# Patient Record
Sex: MALE | Race: WHITE | NOT HISPANIC OR LATINO | Employment: FULL TIME | URBAN - METROPOLITAN AREA
[De-identification: names, ages, dates, MRNs, and addresses within clinical notes are randomized per-mention and may not be internally consistent; named-entity substitution may affect disease eponyms.]

---

## 2018-05-25 ENCOUNTER — OFFICE VISIT (OUTPATIENT)
Dept: URGENT CARE | Facility: CLINIC | Age: 68
End: 2018-05-25
Payer: COMMERCIAL

## 2018-05-25 VITALS
WEIGHT: 205 LBS | TEMPERATURE: 98 F | OXYGEN SATURATION: 98 % | HEIGHT: 74 IN | SYSTOLIC BLOOD PRESSURE: 148 MMHG | DIASTOLIC BLOOD PRESSURE: 73 MMHG | BODY MASS INDEX: 26.31 KG/M2 | HEART RATE: 64 BPM | RESPIRATION RATE: 16 BRPM

## 2018-05-25 DIAGNOSIS — M54.32 SCIATICA OF LEFT SIDE: Primary | ICD-10-CM

## 2018-05-25 PROCEDURE — 99204 OFFICE O/P NEW MOD 45 MIN: CPT | Mod: S$GLB,,,

## 2018-05-25 RX ORDER — CYCLOBENZAPRINE HCL 5 MG
5 TABLET ORAL 2 TIMES DAILY
Qty: 20 TABLET | Refills: 0 | Status: SHIPPED | OUTPATIENT
Start: 2018-05-25 | End: 2018-06-04

## 2018-05-25 RX ORDER — TRAMADOL HYDROCHLORIDE 50 MG/1
TABLET ORAL
Qty: 10 TABLET | Refills: 0 | Status: SHIPPED | OUTPATIENT
Start: 2018-05-25

## 2018-05-25 NOTE — PATIENT INSTRUCTIONS

## 2018-05-25 NOTE — PROGRESS NOTES
"Subjective:       Patient ID: Dakota Guzman is a 67 y.o. male.    Vitals:  height is 6' 2" (1.88 m) and weight is 93 kg (205 lb). His oral temperature is 98 °F (36.7 °C). His blood pressure is 148/73 (abnormal) and his pulse is 64. His respiration is 16 and oxygen saturation is 98%.     Chief Complaint: Back Pain ( lower left Side )    Pt is 68 yo male with left sided sciatica for 36 hours;      Back Pain   This is a new problem. The current episode started yesterday. The problem occurs constantly (pt c/o left sided back pain. Denies any trauma). The problem has been gradually worsening since onset. The pain is present in the lumbar spine. The pain does not radiate. The pain is at a severity of 6/10. The pain is mild. Pertinent negatives include no abdominal pain, chest pain, fever or headaches. He has tried NSAIDs for the symptoms. The treatment provided no relief.     Review of Systems   Constitution: Negative for chills and fever.   HENT: Negative for sore throat.    Eyes: Negative for blurred vision.   Cardiovascular: Negative for chest pain.   Respiratory: Negative for shortness of breath.    Skin: Negative for rash.   Musculoskeletal: Positive for back pain. Negative for joint pain.        Left hip sciatica;   Gastrointestinal: Negative for abdominal pain, diarrhea, nausea and vomiting.   Neurological: Negative for headaches.   Psychiatric/Behavioral: The patient is not nervous/anxious.        Objective:      Physical Exam   Constitutional: He appears well-developed and well-nourished. No distress.   HENT:   Head: Normocephalic and atraumatic.   Right Ear: External ear normal.   Left Ear: External ear normal.   Nose: Nose normal.   Mouth/Throat: Oropharynx is clear and moist.   Eyes: Conjunctivae and EOM are normal. Pupils are equal, round, and reactive to light.   Neck: Normal range of motion.   Cardiovascular: Normal rate, regular rhythm, normal heart sounds and intact distal pulses.    Pulmonary/Chest: " Effort normal and breath sounds normal.   Musculoskeletal: He exhibits tenderness. He exhibits no edema or deformity.   Left hip sciatica; tender to ROM;   Neurological: He is alert.   Skin: Skin is warm and dry. Capillary refill takes 2 to 3 seconds. He is not diaphoretic.   Psychiatric: He has a normal mood and affect. His behavior is normal. Judgment and thought content normal.       Assessment:       1. Sciatica of left side        Plan:     Exercises as described; muscle relaxant and tramadol at bedtime;     Sciatica of left side  -     cyclobenzaprine (FLEXERIL) 5 MG tablet; Take 1 tablet (5 mg total) by mouth 2 (two) times daily.  Dispense: 20 tablet; Refill: 0  -     traMADol (ULTRAM) 50 mg tablet; At bedtime after meal  Dispense: 10 tablet; Refill: 0

## 2018-05-28 ENCOUNTER — TELEPHONE (OUTPATIENT)
Dept: URGENT CARE | Facility: CLINIC | Age: 68
End: 2018-05-28

## 2018-08-19 ENCOUNTER — OFFICE VISIT (OUTPATIENT)
Dept: URGENT CARE | Facility: CLINIC | Age: 68
End: 2018-08-19
Payer: COMMERCIAL

## 2018-08-19 VITALS
RESPIRATION RATE: 16 BRPM | TEMPERATURE: 98 F | OXYGEN SATURATION: 97 % | BODY MASS INDEX: 27.77 KG/M2 | HEART RATE: 86 BPM | WEIGHT: 205 LBS | DIASTOLIC BLOOD PRESSURE: 71 MMHG | SYSTOLIC BLOOD PRESSURE: 124 MMHG | HEIGHT: 72 IN

## 2018-08-19 DIAGNOSIS — M46.1 SACROILIITIS: Primary | ICD-10-CM

## 2018-08-19 PROCEDURE — 99214 OFFICE O/P EST MOD 30 MIN: CPT | Mod: 25,S$GLB,, | Performed by: PHYSICIAN ASSISTANT

## 2018-08-19 PROCEDURE — 96372 THER/PROPH/DIAG INJ SC/IM: CPT | Mod: S$GLB,,, | Performed by: PHYSICIAN ASSISTANT

## 2018-08-19 RX ORDER — PREDNISONE 20 MG/1
TABLET ORAL
Qty: 12 TABLET | Refills: 0 | Status: SHIPPED | OUTPATIENT
Start: 2018-08-19

## 2018-08-19 RX ORDER — KETOROLAC TROMETHAMINE 30 MG/ML
30 INJECTION, SOLUTION INTRAMUSCULAR; INTRAVENOUS
Status: COMPLETED | OUTPATIENT
Start: 2018-08-19 | End: 2018-08-19

## 2018-08-19 RX ORDER — MELOXICAM 15 MG/1
15 TABLET ORAL DAILY
Qty: 14 TABLET | Refills: 0 | Status: SHIPPED | OUTPATIENT
Start: 2018-08-19

## 2018-08-19 RX ADMIN — KETOROLAC TROMETHAMINE 30 MG: 30 INJECTION, SOLUTION INTRAMUSCULAR; INTRAVENOUS at 10:08

## 2018-08-19 NOTE — PATIENT INSTRUCTIONS
1.  Take all medications as directed. If you have been prescribed antibiotics, make sure to complete them.   2.  Rest and keep yourself/patient well hydrated. For adults, it is recommended to drink at least 8-10 glasses of water daily.   3.  For patients above 6 months of age who are not allergic to and are not on anticoagulants, you can alternate Tylenol and Motrin every 4-6 hours for fever above 100.4F and/or pain.  For patients less than 6 months of age, allergic to or intolerant to NSAIDS, have gastritis, gastric ulcers, or history of GI bleeds, are pregnant, or are on anticoagulant therapy, you can take Tylenol every 4 hours as needed for fever above 100.4F and/or pain.   4. You should schedule a follow-up appointment with your Primary Care Provider/Pediatrician for recheck in 2-3 days or as directed at this visit.   5.  If your condition fails to improve in a timely manner, you should receive another evaluation by your Primary Care Provider/Pediatrician to discuss your concerns or return to urgent care for a recheck.  If your condition worsens at any time, you should report immediately to your nearest Emergency Department for further evaluation. **You must understand that you have received Urgent Care treatment only and that you may be released before all of your medical problems are known or treated. You, the patient, are responsible to arrange for follow-up care as instructed.         Sacroiliitis  The sacrum is the triangle-shaped bone at the base of the spine. It is linked to the other pelvis bones by the sacroiliac joints, also called SI joints. Sacroiliitis is when one or both SI joints are hurt or inflamed. It can make small movements of the lower back and pelvis very painful.        This condition has been linked to other diseases. They include ankylosing spondylitis, rheumatoid arthritis, psoriasis, and Crohns disease or colitis. Symptoms may include pain or stiffness in the hips, lower back, thighs,  or buttocks. Pain occurs most often in the morning or after sitting for long periods of time. The pain may get worse when you walk. The swinging motion of the hips strains the SI joints.  Sacroiliitis is caused by many factors such as:  · Heavy lifting, especially if not done the right way  · Severe injury, such as a fall or car accident  · Osteoarthritis  · Pregnancy  · Infection of the joint  This condition is hard to diagnose. It may be confused with other causes of low back pain. To confirm the diagnosis, you may be given a shot of numbing medicine in the SI joint. Treatment includes rest, physical therapy, and anti-inflammatory medicines. If another health problem is the cause, then that must also be treated. More testing may be needed if your symptoms dont get better.  Home care  · If your healthcare provider has prescribed medicines, take all of them as directed.  · You may use over-the-counter pain medicine to control pain, unless another medicine was prescribed. If prednisone was prescribed, dont use NSAIDs, or nonsteroidal anti-inflammatory drugs, such as ibuprofen or naproxen. Talk with your provider before using this medicine if you have chronic liver or kidney disease or ever had a stomach ulcer or GI bleeding.  · If you were referred to physical therapy, make an appointment. Be sure to do any prescribed exercises.  · Dont smoke. Smoking reduces blood flow to the inflamed area. This makes it harder to treat.  Follow-up care  Follow up with your healthcare provider, or as advised.  If you had an X-ray or an MRI, you will be notified of any new findings that may affect your care.  When to seek medical advice  Contact your healthcare provider right away if any of these occur:  · Increasing low back pain  · Inflammation of the eyes  · Skin rash or redness  · Weakness or numbness in one or both legs  · Loss of bowel or bladder control  · Numbness in the groin area  Date Last Reviewed: 11/24/2015  ©  7124-8200 [employer]. [address] [place] [address], [place] [address]. All rights reserved. This information is not intended as a substitute for professional medical care. Always follow your healthcare professional's instructions.

## 2018-08-19 NOTE — PROGRESS NOTES
Subjective:       Patient ID: Dakota Guzman is a 67 y.o. male.    Vitals:  height is 6' (1.829 m) and weight is 93 kg (205 lb). His oral temperature is 97.7 °F (36.5 °C). His blood pressure is 124/71 and his pulse is 86. His respiration is 16 and oxygen saturation is 97%.     Chief Complaint: Back Pain (lower )    Patient complains of back pain that has been present for the past 3 days.  Patient states that the pain is located on the right lower portion of his back.  He denies any injury or trauma.  He reports a history of arthritis to his L-spine.  Patient denies any radiation of pain, saddle anesthesia, bowel or bladder incontinence, or numbness/weakness in their extremities.  Patient also denies any urinary symptoms including burning with urination, urgency, frequency, hematuria, or discharge.  Patient denies fever and chills.  Patient denies any other complaints at this time.        Back Pain   This is a new problem. The current episode started in the past 7 days. The problem has been gradually worsening since onset. The pain is present in the lumbar spine and sacro-iliac. The quality of the pain is described as aching. The pain is at a severity of 8/10. The pain is moderate. The symptoms are aggravated by bending. Pertinent negatives include no abdominal pain, bladder incontinence, bowel incontinence, chest pain, dysuria, fever or numbness. Risk factors: pt c/o lower right sided back pain. denies any trauma. He has tried NSAIDs and heat for the symptoms. The treatment provided no relief.     Review of Systems   Constitution: Negative for chills, fever and malaise/fatigue.   Cardiovascular: Negative for chest pain.   Respiratory: Negative for shortness of breath.    Skin: Negative for rash.   Musculoskeletal: Positive for back pain and joint pain. Negative for muscle cramps, muscle weakness and stiffness.   Gastrointestinal: Negative for abdominal pain, bowel incontinence, diarrhea, nausea and vomiting.    Genitourinary: Negative for bladder incontinence, dysuria, hematuria and urgency.   Neurological: Negative for disturbances in coordination and numbness.   All other systems reviewed and are negative.      Objective:      Physical Exam   Constitutional: He is oriented to person, place, and time. Vital signs are normal. He appears well-developed and well-nourished. He is active and cooperative. No distress.   HENT:   Head: Normocephalic and atraumatic.   Nose: Nose normal.   Mouth/Throat: Oropharynx is clear and moist and mucous membranes are normal.   Eyes: Conjunctivae and lids are normal.   Neck: Trachea normal, normal range of motion, full passive range of motion without pain and phonation normal. Neck supple.   Cardiovascular: Normal rate, regular rhythm, normal heart sounds, intact distal pulses and normal pulses.   Pulmonary/Chest: Effort normal and breath sounds normal.   Abdominal: Soft. Normal appearance and bowel sounds are normal. He exhibits no abdominal bruit, no pulsatile midline mass and no mass.   Musculoskeletal: He exhibits no edema or deformity.        Lumbar back: He exhibits tenderness and pain. He exhibits normal range of motion, no bony tenderness (no midline/spinal tenderness present), no swelling, no edema, no deformity, no laceration, no spasm and normal pulse.        Back:    Neurological: He is alert and oriented to person, place, and time. He has normal strength and normal reflexes. No sensory deficit.   Skin: Skin is warm, dry and intact. He is not diaphoretic.   Psychiatric: He has a normal mood and affect. His speech is normal and behavior is normal. Judgment and thought content normal. Cognition and memory are normal.   Nursing note and vitals reviewed.      Assessment:       1. Sacroiliitis        Plan:         Sacroiliitis  -     ketorolac injection 30 mg; Inject 1 mL (30 mg total) into the muscle one time.  -     meloxicam (MOBIC) 15 MG tablet; Take 1 tablet (15 mg total) by  mouth once daily.  Dispense: 14 tablet; Refill: 0  -     predniSONE (DELTASONE) 20 MG tablet; Take 60 mg daily for 2 days, then 40 mg daily for 2 days, then 20 mg daily for 2 days.  Dispense: 12 tablet; Refill: 0          Patient Instructions   1.  Take all medications as directed. If you have been prescribed antibiotics, make sure to complete them.   2.  Rest and keep yourself/patient well hydrated. For adults, it is recommended to drink at least 8-10 glasses of water daily.   3.  For patients above 6 months of age who are not allergic to and are not on anticoagulants, you can alternate Tylenol and Motrin every 4-6 hours for fever above 100.4F and/or pain.  For patients less than 6 months of age, allergic to or intolerant to NSAIDS, have gastritis, gastric ulcers, or history of GI bleeds, are pregnant, or are on anticoagulant therapy, you can take Tylenol every 4 hours as needed for fever above 100.4F and/or pain.   4. You should schedule a follow-up appointment with your Primary Care Provider/Pediatrician for recheck in 2-3 days or as directed at this visit.   5.  If your condition fails to improve in a timely manner, you should receive another evaluation by your Primary Care Provider/Pediatrician to discuss your concerns or return to urgent care for a recheck.  If your condition worsens at any time, you should report immediately to your nearest Emergency Department for further evaluation. **You must understand that you have received Urgent Care treatment only and that you may be released before all of your medical problems are known or treated. You, the patient, are responsible to arrange for follow-up care as instructed.         Sacroiliitis  The sacrum is the triangle-shaped bone at the base of the spine. It is linked to the other pelvis bones by the sacroiliac joints, also called SI joints. Sacroiliitis is when one or both SI joints are hurt or inflamed. It can make small movements of the lower back and pelvis  very painful.        This condition has been linked to other diseases. They include ankylosing spondylitis, rheumatoid arthritis, psoriasis, and Crohns disease or colitis. Symptoms may include pain or stiffness in the hips, lower back, thighs, or buttocks. Pain occurs most often in the morning or after sitting for long periods of time. The pain may get worse when you walk. The swinging motion of the hips strains the SI joints.  Sacroiliitis is caused by many factors such as:  · Heavy lifting, especially if not done the right way  · Severe injury, such as a fall or car accident  · Osteoarthritis  · Pregnancy  · Infection of the joint  This condition is hard to diagnose. It may be confused with other causes of low back pain. To confirm the diagnosis, you may be given a shot of numbing medicine in the SI joint. Treatment includes rest, physical therapy, and anti-inflammatory medicines. If another health problem is the cause, then that must also be treated. More testing may be needed if your symptoms dont get better.  Home care  · If your healthcare provider has prescribed medicines, take all of them as directed.  · You may use over-the-counter pain medicine to control pain, unless another medicine was prescribed. If prednisone was prescribed, dont use NSAIDs, or nonsteroidal anti-inflammatory drugs, such as ibuprofen or naproxen. Talk with your provider before using this medicine if you have chronic liver or kidney disease or ever had a stomach ulcer or GI bleeding.  · If you were referred to physical therapy, make an appointment. Be sure to do any prescribed exercises.  · Dont smoke. Smoking reduces blood flow to the inflamed area. This makes it harder to treat.  Follow-up care  Follow up with your healthcare provider, or as advised.  If you had an X-ray or an MRI, you will be notified of any new findings that may affect your care.  When to seek medical advice  Contact your healthcare provider right away if any of  these occur:  · Increasing low back pain  · Inflammation of the eyes  · Skin rash or redness  · Weakness or numbness in one or both legs  · Loss of bowel or bladder control  · Numbness in the groin area  Date Last Reviewed: 11/24/2015  © 0732-1874 [employer]. [address] [place] [address]. All rights reserved. This information is not intended as a substitute for professional medical care. Always follow your healthcare professional's instructions.

## 2019-12-28 ENCOUNTER — OFFICE VISIT (OUTPATIENT)
Dept: URGENT CARE | Facility: CLINIC | Age: 69
End: 2019-12-28
Payer: COMMERCIAL

## 2019-12-28 VITALS
SYSTOLIC BLOOD PRESSURE: 157 MMHG | TEMPERATURE: 98 F | OXYGEN SATURATION: 99 % | BODY MASS INDEX: 27.77 KG/M2 | WEIGHT: 205 LBS | HEART RATE: 60 BPM | DIASTOLIC BLOOD PRESSURE: 74 MMHG | HEIGHT: 72 IN

## 2019-12-28 DIAGNOSIS — J45.909 ACUTE ASTHMATIC BRONCHITIS: Primary | ICD-10-CM

## 2019-12-28 PROCEDURE — 99204 OFFICE O/P NEW MOD 45 MIN: CPT | Mod: S$GLB,,, | Performed by: INTERNAL MEDICINE

## 2019-12-28 PROCEDURE — 99204 PR OFFICE/OUTPT VISIT, NEW, LEVL IV, 45-59 MIN: ICD-10-PCS | Mod: S$GLB,,, | Performed by: INTERNAL MEDICINE

## 2019-12-28 PROCEDURE — 71046 X-RAY EXAM CHEST 2 VIEWS: CPT | Mod: FY,S$GLB,, | Performed by: RADIOLOGY

## 2019-12-28 PROCEDURE — 71046 XR CHEST PA AND LATERAL: ICD-10-PCS | Mod: FY,S$GLB,, | Performed by: RADIOLOGY

## 2019-12-28 RX ORDER — PREDNISONE 20 MG/1
20 TABLET ORAL DAILY
Qty: 5 TABLET | Refills: 0 | Status: SHIPPED | OUTPATIENT
Start: 2019-12-28 | End: 2020-01-02

## 2019-12-28 RX ORDER — AMOXICILLIN AND CLAVULANATE POTASSIUM 875; 125 MG/1; MG/1
1 TABLET, FILM COATED ORAL 2 TIMES DAILY
Qty: 20 TABLET | Refills: 0 | Status: SHIPPED | OUTPATIENT
Start: 2019-12-28 | End: 2020-01-07

## 2019-12-28 NOTE — PROGRESS NOTES
Subjective:       Patient ID: Dakota Guzman is a 69 y.o. male.    Vitals:  height is 6' (1.829 m) and weight is 93 kg (205 lb). His oral temperature is 97.9 °F (36.6 °C). His blood pressure is 157/74 (abnormal) and his pulse is 60. His oxygen saturation is 99%.     Chief Complaint: Sinus Problem    Sinus Problem   This is a new problem. The current episode started in the past 7 days. The problem has been gradually worsening since onset. There has been no fever. Associated symptoms include congestion, coughing, headaches, sinus pressure and sneezing. Pertinent negatives include no chills, diaphoresis, ear pain, shortness of breath or sore throat. Treatments tried: Antihistamine. The treatment provided no relief.       Constitution: Negative for chills, sweating, fatigue and fever.   HENT: Positive for congestion and sinus pressure. Negative for ear pain, sinus pain, sore throat and voice change.    Neck: Negative for painful lymph nodes.   Eyes: Negative for eye redness.   Respiratory: Positive for cough. Negative for chest tightness, sputum production, bloody sputum, COPD, shortness of breath, stridor, wheezing and asthma.    Gastrointestinal: Negative for nausea and vomiting.   Musculoskeletal: Negative for muscle ache.   Skin: Negative for rash.   Allergic/Immunologic: Positive for sneezing. Negative for seasonal allergies and asthma.   Neurological: Positive for headaches.   Hematologic/Lymphatic: Negative for swollen lymph nodes.       Objective:      Physical Exam   Cardiovascular: Normal rate, regular rhythm, S1 normal, S2 normal, normal heart sounds and normal pulses.   No murmur heard.  Pulmonary/Chest: No accessory muscle usage. No respiratory distress. He has decreased breath sounds in the right lower field and the left lower field. He has wheezes in the right middle field, the right lower field, the left middle field and the left lower field. He has rhonchi in the right lower field, the left middle field  and the left lower field. He has rales in the left middle field and the left lower field.         Assessment:       1. Acute asthmatic bronchitis        Plan:         Acute asthmatic bronchitis  -     Ambulatory referral to Smoking Cessation Program  -     X-Ray Chest PA And Lateral; Future; Expected date: 01/04/2020  -     amoxicillin-clavulanate 875-125mg (AUGMENTIN) 875-125 mg per tablet; Take 1 tablet by mouth 2 (two) times daily. for 10 days  Dispense: 20 tablet; Refill: 0  -     predniSONE (DELTASONE) 20 MG tablet; Take 1 tablet (20 mg total) by mouth once daily. for 5 days  Dispense: 5 tablet; Refill: 0    take meds

## 2019-12-28 NOTE — PATIENT INSTRUCTIONS
Bronchitis with Wheezing (Viral or Bacterial: Adult)    Bronchitis is an infection of the air passages. It often occurs during a cold and is usually caused by a virus. Symptoms include cough with mucus (phlegm) and low-grade fever. This illness is contagious during the first few days and is spread through the air by coughing and sneezing, or by direct contact (touching the sick person and then touching your own eyes, nose, or mouth).  If there is a lot of inflammation, air flow is restricted. The air passages may also go into spasm, especially if you have asthma. This causes wheezing and difficulty breathing even in people who do not have asthma.  Bronchitis usually lasts 7 to 14 days. The wheezing should improve with treatment during the first week. An inhaler is often prescribed to relax the air passages and stop wheezing. Antibiotics will be prescribed if your doctor thinks there is also a secondary bacterial infection.  Home care  · If symptoms are severe, rest at home for the first 2 to 3 days. When you go back to your usual activities, don't let yourself get too tired.  · Do not smoke. Also avoid being exposed to secondhand smoke.  · You may use over-the-counter medicine to control fever or pain, unless another medicine was prescribed. Note: If you have chronic liver or kidney disease or have ever had a stomach ulcer or gastrointestinal bleeding, talk with your healthcare provider before using these medicines. Also talk to your provider if you are taking medicine to prevent blood clots.) Aspirin should never be given to anyone younger than 18 years of age who is ill with a viral infection or fever. It may cause severe liver or brain damage.  · Your appetite may be poor, so a light diet is fine. Avoid dehydration by drinking 6 to 8 glasses of fluids per day (such as water, soft drinks, sports drinks, juices, tea, or soup). Extra fluids will help loosen secretions in the nose and lungs.  · Over-the-counter  cough, cold, and sore-throat medicines will not shorten the length of the illness, but they may be helpful to reduce symptoms. (Note: Do not use decongestants if you have high blood pressure.)  · If you were given an inhaler, use it exactly as directed. If you need to use it more often than prescribed, your condition may be worsening. If this happens, contact your healthcare provider.  · If prescribed, finish all antibiotic medicine, even if you are feeling better after only a few days.  Follow-up care  Follow up with your healthcare provider, or as advised. If you had an X-ray or ECG (electrocardiogram), a specialist will review it. You will be notified of any new findings that may affect your care.  Note: If you are age 65 or older, or if you have a chronic lung disease or condition that affects your immune system, or you smoke, talk to your healthcare provider about having a pneumococcal vaccinations and a yearly influenza vaccination (flu shot).  When to seek medical advice  Call your healthcare provider right away if any of these occur:  · Fever of 100.4°F (38°C) or higher  · Coughing up increasing amounts of colored sputum  · Weakness, drowsiness, headache, facial pain, ear pain, or a stiff neck  Call 911, or get immediate medical care  Contact emergency services right away if any of these occur.  · Coughing up blood  · Worsening weakness, drowsiness, headache, or stiff neck  · Increased wheezing not helped with medication, shortness of breath, or pain with breathing  Date Last Reviewed: 9/13/2015  © 2784-4892 [employer]. [address] [place] [address], [place] [address]. All rights reserved. This information is not intended as a substitute for professional medical care. Always follow your healthcare professional's instructions.    Please return here or go to the Emergency Department for any concerns or worsening of condition.  If you were prescribed antibiotics, please take them to completion.  If you  were prescribed a narcotic medication, do not drive or operate heavy equipment or machinery while taking these medications.  Please follow up with your primary care doctor or specialist as needed.    If you  smoke, please stop smoking.    1) Motrin/advil/ibuprofen- Take Two to Three Tablets(200 mg) three Times a Day for 5 to 7 Days.  2) Mucinex D 1/2 to 1 Tablet twice a day for 5 to 7 Days.  3) Drink Hot Liquids(coffee,WATER,Tea,Hot Chocolate,or Soup) that you put in a Mug place in Microwave for 2.5 to 3 minutes CHANGE THE CUP THAT WAS USED IN THE MICROWAVE SO AS NOT TO BURN YOUR MOUTH,then sniff the steam from the cup and sip the heated liquid TEN TO TWELVE TIMES A DAY for 5 to 7 Days.  4) These 3 things will help the antibiotics and other medications work faster and will speed your recovery!